# Patient Record
Sex: FEMALE | Race: ASIAN | NOT HISPANIC OR LATINO
[De-identification: names, ages, dates, MRNs, and addresses within clinical notes are randomized per-mention and may not be internally consistent; named-entity substitution may affect disease eponyms.]

---

## 2019-06-18 ENCOUNTER — APPOINTMENT (OUTPATIENT)
Dept: CARDIOLOGY | Facility: CLINIC | Age: 44
End: 2019-06-18
Payer: MEDICAID

## 2019-06-18 ENCOUNTER — NON-APPOINTMENT (OUTPATIENT)
Age: 44
End: 2019-06-18

## 2019-06-18 VITALS
HEIGHT: 67 IN | SYSTOLIC BLOOD PRESSURE: 85 MMHG | DIASTOLIC BLOOD PRESSURE: 56 MMHG | WEIGHT: 133 LBS | OXYGEN SATURATION: 99 % | BODY MASS INDEX: 20.88 KG/M2 | HEART RATE: 73 BPM

## 2019-06-18 DIAGNOSIS — Z87.891 PERSONAL HISTORY OF NICOTINE DEPENDENCE: ICD-10-CM

## 2019-06-18 DIAGNOSIS — R94.31 ABNORMAL ELECTROCARDIOGRAM [ECG] [EKG]: ICD-10-CM

## 2019-06-18 PROCEDURE — 93000 ELECTROCARDIOGRAM COMPLETE: CPT

## 2019-06-18 PROCEDURE — 99203 OFFICE O/P NEW LOW 30 MIN: CPT | Mod: 25

## 2019-06-18 NOTE — REVIEW OF SYSTEMS
[Headache] : headache [Blurry Vision] : blurred vision [Chest Pain] : chest pain [Cough] : cough [Joint Pain] : joint pain [Dizziness] : dizziness [Negative] : Heme/Lymph

## 2019-06-18 NOTE — DISCUSSION/SUMMARY
[FreeTextEntry1] : \par Abnormal ECG: Mildly abnormal ECG in the absence of clearly documented heart disease -- although some "minor" problem may have been identified on examination in [place]; I recommend echocardiography + exercise ECG stress test; I told Mrs. Gibbs that I will contact her by telephone to discuss findings.

## 2019-06-18 NOTE — HISTORY OF PRESENT ILLNESS
[FreeTextEntry1] : Mrs. Gibbs is a healthy 43-year-old woman who presents for evaluation of an abnormal electrocardiogram.  She has a vague history of heart disease -- diagnosed in [place]; patient cannot tell me any details of the diagnosis.  There is no clear history of congenital, valvular, or ischemic heart disease;.  She has been feeling well but describes occasional dizziness, chest pain, and lightheadedness; he denies syncope.  She describes her chest pain as nonanginal.  She says that she does not exercise because she is "lazy."  She did not experience chest pain or dyspnea with physical exertion.

## 2019-06-18 NOTE — PHYSICAL EXAM
[Well Groomed] : well groomed [General Appearance - In No Acute Distress] : no acute distress [Eyelids - No Xanthelasma] : the eyelids demonstrated no xanthelasmas [No Oral Cyanosis] : no oral cyanosis [FreeTextEntry1] : No JVD [Heart Rate And Rhythm] : heart rate and rhythm were normal [Heart Sounds] : normal S1 and S2 [Edema] : no peripheral edema present [Respiration, Rhythm And Depth] : normal respiratory rhythm and effort [Auscultation Breath Sounds / Voice Sounds] : lungs were clear to auscultation bilaterally [Bowel Sounds] : normal bowel sounds [Abdomen Soft] : soft [Abnormal Walk] : normal gait [Cyanosis, Localized] : no localized cyanosis [] : no rash [Impaired Insight] : insight and judgment were intact [Oriented To Time, Place, And Person] : oriented to person, place, and time

## 2019-07-02 ENCOUNTER — APPOINTMENT (OUTPATIENT)
Dept: CARDIOLOGY | Facility: CLINIC | Age: 44
End: 2019-07-02
Payer: COMMERCIAL

## 2019-07-02 PROCEDURE — 93306 TTE W/DOPPLER COMPLETE: CPT

## 2019-07-08 ENCOUNTER — APPOINTMENT (OUTPATIENT)
Dept: CARDIOLOGY | Facility: CLINIC | Age: 44
End: 2019-07-08
Payer: COMMERCIAL

## 2019-07-08 PROCEDURE — 93015 CV STRESS TEST SUPVJ I&R: CPT

## 2024-05-27 ENCOUNTER — NON-APPOINTMENT (OUTPATIENT)
Age: 49
End: 2024-05-27